# Patient Record
Sex: MALE | Race: BLACK OR AFRICAN AMERICAN | NOT HISPANIC OR LATINO | URBAN - METROPOLITAN AREA
[De-identification: names, ages, dates, MRNs, and addresses within clinical notes are randomized per-mention and may not be internally consistent; named-entity substitution may affect disease eponyms.]

---

## 2017-12-15 ENCOUNTER — HISTORICAL (OUTPATIENT)
Dept: LAB | Facility: HOSPITAL | Age: 22
End: 2017-12-15

## 2017-12-16 LAB — GRAM STN SPEC: NORMAL

## 2017-12-20 LAB — FINAL CULTURE: NORMAL

## 2017-12-29 ENCOUNTER — HISTORICAL (OUTPATIENT)
Dept: LAB | Facility: HOSPITAL | Age: 22
End: 2017-12-29

## 2017-12-29 LAB — GRAM STN SPEC: NORMAL

## 2018-01-02 LAB — FINAL CULTURE: NORMAL

## 2021-03-23 LAB
BUN SERPL-MCNC: 9.3 MG/DL (ref 8.9–20.6)
CALCIUM SERPL-MCNC: 9 MG/DL (ref 8.4–10.2)
CHLORIDE SERPL-SCNC: 105 MMOL/L (ref 98–107)
CO2 SERPL-SCNC: 27 MMOL/L (ref 22–29)
CREAT SERPL-MCNC: 0.88 MG/DL (ref 0.73–1.18)
CREAT/UREA NIT SERPL: 11
GLUCOSE SERPL-MCNC: 88 MG/DL (ref 74–100)
POTASSIUM SERPL-SCNC: 4.4 MMOL/L (ref 3.5–5.1)
SARS-COV-2 RNA RESP QL NAA+PROBE: NOT DETECTED
SODIUM SERPL-SCNC: 140 MMOL/L (ref 136–145)

## 2021-03-26 ENCOUNTER — HISTORICAL (OUTPATIENT)
Dept: SURGERY | Facility: HOSPITAL | Age: 26
End: 2021-03-26

## 2021-03-26 LAB — EST CREAT CLEARANCE SER (OHS): 177.64 ML/MIN

## 2024-03-06 ENCOUNTER — ON-DEMAND VIRTUAL (OUTPATIENT)
Dept: URGENT CARE | Facility: CLINIC | Age: 29
End: 2024-03-06
Payer: MEDICAID

## 2024-03-06 DIAGNOSIS — B35.6 TINEA CRURIS: Primary | ICD-10-CM

## 2024-03-06 PROCEDURE — 99203 OFFICE O/P NEW LOW 30 MIN: CPT | Mod: 95,,, | Performed by: NURSE PRACTITIONER

## 2024-03-06 RX ORDER — FLUCONAZOLE 150 MG/1
150 TABLET ORAL DAILY
Qty: 2 TABLET | Refills: 0 | Status: SHIPPED | OUTPATIENT
Start: 2024-03-06 | End: 2024-03-08

## 2024-03-06 RX ORDER — KETOCONAZOLE 20 MG/G
CREAM TOPICAL
COMMUNITY
Start: 2024-01-29

## 2024-03-06 RX ORDER — KETOCONAZOLE 20 MG/G
CREAM TOPICAL DAILY
Qty: 30 G | Refills: 0 | Status: SHIPPED | OUTPATIENT
Start: 2024-03-06 | End: 2024-03-20

## 2024-03-06 NOTE — PATIENT INSTRUCTIONS
Patient Education       [facility] Discharge Instructions   About this topic   Ringworm is a skin infection. It is caused by a germ. It is not caused by a worm. The infected skin is often shaped like a ring with reddish edges. The center may be flaky, dry, and itchy. You can have ringworm on all parts of your body. Jock itch is the name for ringworm in your groin area.  This infection spreads easily from one person to another. You can get it by touching other people or by touching things that they have touched. The germs can also be spread by pets.       What care is needed at home?   Ask your doctor what you need to do when you go home. Make sure you ask questions if you do not understand what the doctor says. This way you will know what you need to do.  Keep your skin clean and dry. Shower each day. Dry your groin well after showering.  Wear loose clothing that will not rub and bother the infected area.  Change your clothes and towels each day while you are infected.  Wash anything that has touched your rash in hot water. This includes towels and clothing.  Do not scratch the rash. Scratching may cause it to spread or get infected.   What follow-up care is needed?   Your doctor may ask you to make visits to the office to check on your progress. Be sure to keep these visits.  What drugs may be needed?   The doctor may order drugs to:  Fight an infection  Kill the fungus  Help with itching  Will physical activity be limited?   Do not play sports where you have to touch other people, like wrestling, until your rash is gone. Do not share sports equipment until your rash is gone.  What problems could happen?   Infection  Open sores  Scarring  What can be done to prevent this health problem?   Take care of your body  Wash your hands regularly. Wash for 20 seconds with soap and running water.  Keep your groin clean. Dry it well before putting on underwear and clothing.  Use a drying powder after showering or bathing. Use  talcum powder to control sweaty feet and hands.  Clip your fingernails and toenails short and keep them clean.  Wear slippers or sandals in public areas like spas, locker rooms, and gym showers.  Take care of places and things around you  Wash athletic supports often.  Change your socks and underwear each day.  Do not share personal items like towels, clothing, or sports equipment.  Clean exercise equipment at the gym before you use it.  Wash your hands with soap and water after you play with your pets. Do not touch your pets if they have bald spots. Take them to the vet to check for ringworm.  Watch other members of your family carefully for signs of ringworm. It is very easy to catch ringworm from other people.  When do I need to call the doctor?   Signs of infection. These include a fever of 100.4°F (38°C) or higher, chills.  Signs of wound infection. These include swelling, redness, warmth around the sores; too much pain when touched; yellowish, greenish, or bloody discharge; foul smell coming from the sores; sores open up.  Infected area spreads after treatment  Teach Back: Helping You Understand   The Teach Back Method helps you understand the information we are giving you. After you talk with the staff, tell them in your own words what you learned. This helps to make sure the staff has described each thing clearly. It also helps to explain things that may have been confusing. Before going home, make sure you can do these:  I can tell you about my condition.  I can tell you how to care for my skin.  I can tell you what I will do if my infected area is warm, red, tender, swollen or has sores that break open.  Where can I learn more?   Centers for Disease Control and Prevention  https://www.cdc.gov/fungal/diseases/ringworm/   KidsHealth  https://kidshealth.org/en/teens/jock-itch.html?WT.ac=t-ra-March 2018   KidsHealth  http://kidshealth.org/teen/infections/fungal/ringworm.html   Last Reviewed Date    2021-05-14  Consumer Information Use and Disclaimer   This information is not specific medical advice and does not replace information you receive from your health care provider. This is only a brief summary of general information. It does NOT include all information about conditions, illnesses, injuries, tests, procedures, treatments, therapies, discharge instructions or life-style choices that may apply to you. You must talk with your health care provider for complete information about your health and treatment options. This information should not be used to decide whether or not to accept your health care providers advice, instructions or recommendations. Only your health care provider has the knowledge and training to provide advice that is right for you.  Copyright   Copyright © 2021 [employer] and its affiliates and/or licensors. All rights reserved.

## 2024-03-06 NOTE — PROGRESS NOTES
Subjective:      Patient ID: Raymundo Dietz is a 28 y.o. male.    Vitals:  vitals were not taken for this visit.     Chief Complaint: Recurrent Skin Infections      Visit Type: TELE AUDIOVISUAL    Present with the patient at the time of consultation: TELEMED PRESENT WITH PATIENT: None    No past medical history on file.  No past surgical history on file.  Review of patient's allergies indicates:   Allergen Reactions    Naproxen sodium Blisters, Itching and Swelling     Current Outpatient Medications on File Prior to Visit   Medication Sig Dispense Refill    ketoconazole (NIZORAL) 2 % cream APPLY TO THE AFFECTED AREA(S) EVERY DAY FOR 14 DAYS       No current facility-administered medications on file prior to visit.     No family history on file.    Medications Ordered                [address] [facility]   [address] [address]    Telephone: [phone]   Fax: [phone]   Hours: Not open 24 hours                         E-Prescribed (2 of 2)              fluconazole (DIFLUCAN) 150 MG Tab    Sig: Take 1 tablet (150 mg total) by mouth once daily. Take 1 tablet as a single dose. If symptoms persist, may repeat a second dose in 72 hours. for 2 days       Start: 3/6/24     Quantity: 2 tablet Refills: 0                         ketoconazole (NIZORAL) 2 % cream    Sig: Apply topically once daily. for 14 days       Start: 3/6/24     Quantity: 30 g Refills: 0                           Ohs Peq Odvv Intake    3/6/2024  8:28 AM CST - Filed by Patient   What is your current physical address in the event of a medical emergency? [id number]   Are you able to take your vital signs? No   Please attach any relevant images or files          5 days of symptoms. Itching, redness and chaffing to the groin. No other associated symptoms to report. Out of Nizoral. States needs oral treatment as well to fully resolve.        Skin:  Positive for rash (groin).        Objective:   The physical exam  was conducted virtually.  Physical Exam   Constitutional: He is oriented to person, place, and time. He does not appear ill. No distress.   HENT:   Head: Normocephalic and atraumatic.   Nose: Nose normal.   Eyes: Extraocular movement intact   Pulmonary/Chest: Effort normal.   Abdominal: Normal appearance.   Musculoskeletal: Normal range of motion.         General: Normal range of motion.   Neurological: no focal deficit. He is alert and oriented to person, place, and time.   Skin: Skin is rash.        Psychiatric: His behavior is normal. Mood normal.   Vitals reviewed.      Assessment:     1. Tinea cruris        Plan:   Patient encouraged to monitor symptoms closely and instructed to follow-up for new or worsening symptoms. Further, in-person, evaluation may be necessary for continued treatment. Please follow up with your primary care doctor or specialist as needed. Verbally discussed plan. Patient confirms understanding and is in agreement with treatment and plan.     You must understand that you've received a [facility] Care evaluation only and that you may be released before all your medical problems are known or treated. You, the patient, will arrange for follow up care as instructed.      Tinea cruris  -     fluconazole (DIFLUCAN) 150 MG Tab; Take 1 tablet (150 mg total) by mouth once daily. Take 1 tablet as a single dose. If symptoms persist, may repeat a second dose in 72 hours. for 2 days  Dispense: 2 tablet; Refill: 0  -     ketoconazole (NIZORAL) 2 % cream; Apply topically once daily. for 14 days  Dispense: 30 g; Refill: 0      Patient Instructions   Patient Education       [facility] Itch Discharge Instructions   About this topic   Ringworm is a skin infection. It is caused by a germ. It is not caused by a worm. The infected skin is often shaped like a ring with reddish edges. The center may be flaky, dry, and itchy. You can have ringworm on all parts of your body. Jock itch is the name for ringworm in your groin  area.  This infection spreads easily from one person to another. You can get it by touching other people or by touching things that they have touched. The germs can also be spread by pets.       What care is needed at home?   Ask your doctor what you need to do when you go home. Make sure you ask questions if you do not understand what the doctor says. This way you will know what you need to do.  Keep your skin clean and dry. Shower each day. Dry your groin well after showering.  Wear loose clothing that will not rub and bother the infected area.  Change your clothes and towels each day while you are infected.  Wash anything that has touched your rash in hot water. This includes towels and clothing.  Do not scratch the rash. Scratching may cause it to spread or get infected.   What follow-up care is needed?   Your doctor may ask you to make visits to the office to check on your progress. Be sure to keep these visits.  What drugs may be needed?   The doctor may order drugs to:  Fight an infection  Kill the fungus  Help with itching  Will physical activity be limited?   Do not play sports where you have to touch other people, like wrestling, until your rash is gone. Do not share sports equipment until your rash is gone.  What problems could happen?   Infection  Open sores  Scarring  What can be done to prevent this health problem?   Take care of your body  Wash your hands regularly. Wash for 20 seconds with soap and running water.  Keep your groin clean. Dry it well before putting on underwear and clothing.  Use a drying powder after showering or bathing. Use talcum powder to control sweaty feet and hands.  Clip your fingernails and toenails short and keep them clean.  Wear slippers or sandals in public areas like spas, locker rooms, and gym showers.  Take care of places and things around you  Wash athletic supports often.  Change your socks and underwear each day.  Do not share personal items like towels, clothing,  or sports equipment.  Clean exercise equipment at the gym before you use it.  Wash your hands with soap and water after you play with your pets. Do not touch your pets if they have bald spots. Take them to the vet to check for ringworm.  Watch other members of your family carefully for signs of ringworm. It is very easy to catch ringworm from other people.  When do I need to call the doctor?   Signs of infection. These include a fever of 100.4°F (38°C) or higher, chills.  Signs of wound infection. These include swelling, redness, warmth around the sores; too much pain when touched; yellowish, greenish, or bloody discharge; foul smell coming from the sores; sores open up.  Infected area spreads after treatment  Teach Back: Helping You Understand   The Teach Back Method helps you understand the information we are giving you. After you talk with the staff, tell them in your own words what you learned. This helps to make sure the staff has described each thing clearly. It also helps to explain things that may have been confusing. Before going home, make sure you can do these:  I can tell you about my condition.  I can tell you how to care for my skin.  I can tell you what I will do if my infected area is warm, red, tender, swollen or has sores that break open.  Where can I learn more?   Centers for Disease Control and Prevention  https://www.cdc.gov/fungal/diseases/ringworm/   KidsHealth  https://kidshealth.org/en/teens/jock-itch.html?WT.ac=t-ra-March 2018   KidsHealth  http://kidshealth.org/teen/infections/fungal/ringworm.html   Last Reviewed Date   2021-05-14  Consumer Information Use and Disclaimer   This information is not specific medical advice and does not replace information you receive from your health care provider. This is only a brief summary of general information. It does NOT include all information about conditions, illnesses, injuries, tests, procedures, treatments, therapies, discharge instructions or  life-style choices that may apply to you. You must talk with your health care provider for complete information about your health and treatment options. This information should not be used to decide whether or not to accept your health care providers advice, instructions or recommendations. Only your health care provider has the knowledge and training to provide advice that is right for you.  Copyright   Copyright © 2021 [employer] and its affiliates and/or licensors. All rights reserved.